# Patient Record
Sex: MALE | Race: ASIAN | NOT HISPANIC OR LATINO | Employment: FULL TIME | URBAN - METROPOLITAN AREA
[De-identification: names, ages, dates, MRNs, and addresses within clinical notes are randomized per-mention and may not be internally consistent; named-entity substitution may affect disease eponyms.]

---

## 2018-11-06 ENCOUNTER — OCCUPATIONAL MEDICINE (OUTPATIENT)
Dept: URGENT CARE | Facility: CLINIC | Age: 28
End: 2018-11-06
Payer: COMMERCIAL

## 2018-11-06 ENCOUNTER — APPOINTMENT (OUTPATIENT)
Dept: RADIOLOGY | Facility: IMAGING CENTER | Age: 28
End: 2018-11-06
Attending: PHYSICIAN ASSISTANT
Payer: COMMERCIAL

## 2018-11-06 VITALS
HEIGHT: 74 IN | RESPIRATION RATE: 14 BRPM | HEART RATE: 68 BPM | TEMPERATURE: 98 F | DIASTOLIC BLOOD PRESSURE: 80 MMHG | WEIGHT: 226 LBS | BODY MASS INDEX: 29 KG/M2 | OXYGEN SATURATION: 97 % | SYSTOLIC BLOOD PRESSURE: 112 MMHG

## 2018-11-06 DIAGNOSIS — S67.10XA CRUSHING INJURY OF FINGER, INITIAL ENCOUNTER: ICD-10-CM

## 2018-11-06 PROCEDURE — 73140 X-RAY EXAM OF FINGER(S): CPT | Mod: 26,RT,29 | Performed by: PHYSICIAN ASSISTANT

## 2018-11-06 PROCEDURE — 99203 OFFICE O/P NEW LOW 30 MIN: CPT | Mod: 29 | Performed by: PHYSICIAN ASSISTANT

## 2018-11-06 NOTE — PROGRESS NOTES
"Subjective:      Ruben Dykes is a 28 y.o. male who presents with Finger Injury (WC New, Crush injury to R/ index finger)      DOI: 11/6/18, 9am. Crush injury right index finger. Was moving 2 \"modules\" which are approx 800lbs. His finger got caught and smashed. Pain tolerable. Bruising, no broken skin. ROM limited. Has not taken anything yet. No previous injury. No 2nd job.     HPI    ROS    Medications, Allergies, and current problem list reviewed today in Epic     Objective:     /80 (BP Location: Left arm, Patient Position: Sitting, BP Cuff Size: Adult)   Pulse 68   Temp 36.7 °C (98 °F) (Temporal)   Resp 14   Ht 1.88 m (6' 2\")   Wt 102.5 kg (226 lb)   SpO2 97%   BMI 29.02 kg/m²      Physical Exam   Constitutional: He is oriented to person, place, and time. He appears well-developed and well-nourished. No distress.   HENT:   Head: Normocephalic and atraumatic.   Eyes: Conjunctivae and EOM are normal.   Neck: Normal range of motion. Neck supple.   Cardiovascular: Normal rate, regular rhythm and normal heart sounds.    No murmur heard.  Pulmonary/Chest: Effort normal and breath sounds normal. No respiratory distress. He has no wheezes.   Neurological: He is alert and oriented to person, place, and time.   Skin: Skin is warm and dry. He is not diaphoretic.   Psychiatric: He has a normal mood and affect. His behavior is normal. Judgment and thought content normal.   Nursing note and vitals reviewed.      Vitals reviewed, well-appearing male in no apparent distress.  Crush injury to distal phalanx of right index finger.  Localized soft tissue swelling, tenderness, ecchymosis.  No broken skin or nail involvement.  Pain does not extend approximately from PIP joint.  Range of motion limited.  Distal neurovascular intact.       Assessment/Plan:     1. Crushing injury of finger, initial encounter  DX-FINGER(S) 2+ RIGHT     Xray: no fracture or dislocation by my read. Radiology review pending.    Finger " splint  Rice therapy  OTC meds as needed  Work restrictions  Follow-up 3 days    Please note that this dictation was created using voice recognition software. I have made every reasonable attempt to correct obvious errors, but I expect that there are errors of grammar and possibly content that I did not discover before finalizing the note.

## 2018-11-06 NOTE — LETTER
"EMPLOYEE’S CLAIM FOR COMPENSATION/ REPORT OF INITIAL TREATMENT  FORM C-4    EMPLOYEE’S CLAIM - PROVIDE ALL INFORMATION REQUESTED   First Name  Ruben Last Name  Donis Birthdate                    1990                Sex  male Claim Number   Home Address  [address] Age  28 y.o. Height  1.88 m (6' 2\") Weight  102.5 kg (226 lb) [facility]     [place] [facility] [place] Zip  [address] Telephone  [phone] (home)    Mailing Address  [address] Zip  [address] Primary Language Spoken  English    Insurer   Third Party   [employer]   Employee's Occupation (Job Title) When Injury or Occupational Disease Occurred      Employer's Name  [employer]  Telephone  [phone]    Employer Address  [address]  [place]  Zip  [address]    Date of Injury  11/6/2018               Hour of Injury  9:00 AM Date Employer Notified  11/6/2018 Last Day of Work after Injury or Occupational Disease  11/6/2018 Supervisor to Whom Injury Reported  thierry cox   Address or Location of Accident (if applicable)  [[employer]  [address]   What were you doing at the time of accident? 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 (Be specific an answer in detail. Use additional sheet if necessary)  While shifting one module to the side another module smashed my fonger   If you believe that you have an occupational disease, when did you first have knowledge of the disability and it relationship to your employment?  n/a Witnesses to the Accident  n/a      Nature of Injury or Occupational Disease  Crushing  Part(s) of Body Injured or Affected  Finger (R), N/A, N/A    I certify that the above is true and correct to the best of my knowledge and that I have provided this information in order to obtain the benefits of " [place]).  I hereby authorize any physician, chiropractor, surgeon, practitioner, or other person, any hospital, including [facility] or [facility], any medical service organization, any insurance company, or other institution or organization to release to each other, any medical or other information, including benefits paid or payable, pertinent to this injury or disease, except information relative to diagnosis, treatment and/or counseling for AIDS, psychological conditions, alcohol or controlled substances, for which I must give specific authorization.  A Photostat of this authorization shall be as valid as the original.     Date   Place   Employee’s Signature   THIS REPORT MUST BE COMPLETED AND MAILED WITHIN 3 WORKING DAYS OF TREATMENT   Place  [facility]  Name of Facility  [facility]   Date  11/6/2018 Diagnosis  (S67.10XA) Crushing injury of finger, initial encounter Is there evidence the injured employee was under the influence of alcohol and/or another controlled substance at the time of accident?   Hour  9:44 AM Description of Injury or Disease  The encounter diagnosis was Crushing injury of finger, initial encounter. No   Treatment  Crush injury, x-ray negative  Finger splint  OTC meds as needed  Follow-up 3 days  Have you advised the patient to remain off work five days or more? No   X-Ray Findings  Negative  Comments:Finger series   If Yes   From Date  To Date      From information given by the employee, together with medical evidence, can you directly connect this injury or occupational disease as job incurred?  Yes If No Full Duty  No Modified Duty  Yes   Is additional medical care by a physician indicated?  Yes If Modified Duty, Specify any Limitations / Restrictions  Weight limit 25 pounds, right hand only   Do you know of any previous injury or disease  "contributing to this condition or occupational disease?                            No   Date  11/6/2018 Print Doctor’s Name Sheyla SALGADOWilbert SOTO Alvarado I certify the employer’s copy of  this form was mailed on:   Address  [address], [address] Insurer’s Use Only     [place] Zip  [address]    Provider’s Tax ID Number  [id number] Telephone  Dept: [phone]        ac-SHEYLA Gallegos P.A.-C.   e-Signature: Dr. Romel Norris, Medical Director Degree  KAYLI        ORIGINAL-TREATING PHYSICIAN OR CHIROPRACTOR    PAGE 2-INSURER/TPA    PAGE 3-EMPLOYER    PAGE 4-EMPLOYEE             Form C-4 (rev10/07)              BRIEF DESCRIPTION OF RIGHTS AND BENEFITS  (Pursuant to [place]RS 616C.050)    Notice of Injury or Occupational Disease (Incident Report Form C-1): If an injury or occupational disease (OD) arises out of and in the  course of employment, you must provide written notice to your employer as soon as practicable, but no later than 7 days after the accident or  OD. Your employer shall maintain a sufficient supply of the required forms.    Claim for Compensation (Form C-4): If medical treatment is sought, the form C-4 is available at the place of initial treatment. A completed  \"Claim for Compensation\" (Form C-4) must be filed within 90 days after an accident or OD. The treating physician or chiropractor must,  within 3 working days after treatment, complete and mail to the employer, the employer's insurer and third-party , the Claim for  Compensation.    Medical Treatment: If you require medical treatment for your on-the-job injury or OD, you may be required to select a physician or  chiropractor from a list provided by your workers’ compensation insurer, if it has contracted with an Organization for Managed Care (MCO) or  Preferred Provider Organization (PPO) or providers of health care. If your employer has not entered into a contract with an MCO or PPO, you  may select a physician or " chiropractor from the Panel of Physicians and Chiropractors. Any medical costs related to your industrial injury or  OD will be paid by your insurer.    Temporary Total Disability (TTD): If your doctor has certified that you are unable to work for a period of at least 5 consecutive days, or 5  cumulative days in a 20-day period, or places restrictions on you that your employer does not accommodate, you may be entitled to TTD  compensation.    Temporary Partial Disability (TPD): If the wage you receive upon reemployment is less than the compensation for TTD to which you are  entitled, the insurer may be required to pay you TPD compensation to make up the difference. TPD can only be paid for a maximum of 24  months.    Permanent Partial Disability (PPD): When your medical condition is stable and there is an indication of a PPD as a result of your injury or  OD, within 30 days, your insurer must arrange for an evaluation by a rating physician or chiropractor to determine the degree of your PPD. The  amount of your PPD award depends on the date of injury, the results of the PPD evaluation and your age and wage.    Permanent Total Disability (PTD): If you are medically certified by a treating physician or chiropractor as permanently and totally disabled  and have been granted a PTD status by your insurer, you are entitled to receive monthly benefits not to exceed 66 2/3% of your average  monthly wage. The amount of your PTD payments is subject to reduction if you previously received a PPD award.    Vocational Rehabilitation Services: You may be eligible for vocational rehabilitation services if you are unable to return to the job due to a  permanent physical impairment or permanent restrictions as a result of your injury or occupational disease.    Transportation and Per Elinor Reimbursement: You may be eligible for travel expenses and per elinor associated with medical treatment.    Reopening: You may be able to reopen your  claim if your condition worsens after claim closure.    Appeal Process: If you disagree with a written determination issued by the insurer or the insurer does not respond to your request, you may  appeal to the Department of Administration, , by following the instructions contained in your determination letter. You must  appeal the determination within 70 days from the date of the determination letter at [address], [address], [place], [place]  [address], or [address], [address], [place], [place] [address]. If you disagree with the  decision, you may appeal to the  Department of Administration, . You must file your appeal within 30 days from the date of the  decision  letter at [address], [address], [place], [place] [address], or [address], [address], [place], [place] [address]. If you  disagree with a decision of an , you may file a petition for judicial review with the District Court. You must do so within 30  days of the Appeal Officer’s decision. You may be represented by an  at your own expense or you may contact the [facility] for possible  representation.    [place]  for Injured Workers (NAIW): If you disagree with a  decision, you may request that NAIW represent you  without charge at an  Hearing. For information regarding denial of benefits, you may contact the [facility] at: [address], [address], [place] [address], [phone], or [address], [place] [address], [phone]    To File a Complaint with the Division: If you wish to file a complaint with the  of the Division of Industrial Relations (DIR),  please contact the Workers’ Compensation Section, [address], [address], [place], [place] [address], telephone (375) [phone], or  [address], [place]  [address], telephone (880) [phone].    For assistance with Workers’ Compensation Issues: you may contact the Office of the Governor Consumer Health Assistance, [address] [place] [address], [facility] [facility] [address], [place] [address], Toll Free 1-896.794.7320, Web site: http://TheFormTool.[facility].nv., E-mail  [email or web address].[email or web address].                                                                                                                                                                                                                                   __________________________________________________________________                                                                   _________________                Employee Name / Signature                                                                                                                                                       Date                                                                                                                                                                                                     D-2 (rev. 10/07)

## 2018-11-06 NOTE — LETTER
"   [facility]  [address] [place] [address]  Phone:  [phone] - Fax:  [phone]   [facility] Progress Report and Disability Certification  Date of Service: 11/6/2018   No Show:  No  Date / Time of Next Visit: 11/9/2018   Claim Information   Patient Name: Ruben Dykes  Claim Number:     Employer: [employer]  Date of Injury: 11/6/2018     Insurer / TPA: [employer]  ID / SSN:     Occupation:   Diagnosis: The encounter diagnosis was Crushing injury of finger, initial encounter.    Medical Information   Related to Industrial Injury? Yes    Subjective Complaints:  DOI: 11/6/18, 9am. Crush injury right index finger. Was moving 2 \"modules\" which are approx 800lbs. His finger got caught and smashed. Pain tolerable. Bruising, no broken skin. ROM limited. Has not taken anything yet. No previous injury. No 2nd job.   Objective Findings: Vitals reviewed, well-appearing male in no apparent distress.  Crush injury to distal phalanx of right index finger.  Localized soft tissue swelling, tenderness, ecchymosis.  No broken skin or nail involvement.  Pain does not extend approximately from PIP joint.  Range of motion limited.  Distal neurovascular intact.   Pre-Existing Condition(s): None   Assessment:   Initial Visit    Status: Additional Care Required  Permanent Disability:No    Plan: Medication  Comments:OTC as needed    Diagnostics: X-ray  Comments:Negative    Comments:       Disability Information   Status: Released to Restricted Duty    From:  11/6/2018  Through: 11/9/2018 Restrictions are: Temporary   Physical Restrictions   Sitting:    Standing:    Stooping:    Bending:      Squatting:    Walking:    Climbing:    Pushing:      Pulling:    Other:    Reaching Above Shoulder (L):   Reaching Above Shoulder (R):       Reaching Below Shoulder (L):    Reaching Below Shoulder (R):      Not to exceed Weight Limits   Carrying(hrs):   " Weight Limit(lb): < or = to 25 pounds  Comments:Right hand only Lifting(hrs):   Weight  Limit(lb): < or = to 25 pounds  Comments:Right hand only   Comments:      Repetitive Actions   Hands: i.e. Fine Manipulations from Grasping:     Feet: i.e. Operating Foot Controls:     Driving / Operate Machinery:     Physician Name: Sheyla Alvarado P.A.-C. Physician Signature: SHEYLA Bennett P.A.-C. e-Signature: Dr. Romel Norris, Medical Director   Clinic Name / Location: [address], [address]  [facility] [place] [address] Clinic Phone Number: Dept: [phone]   Appointment Time: 9:30 Am Visit Start Time: 9:44 AM   Check-In Time:  9:38 Am Visit Discharge Time:  10:20 AM   Original-Treating Physician or Chiropractor    Page 2-Insurer/TPA    Page 3-Employer    Page 4-Employee

## 2018-11-09 ENCOUNTER — OCCUPATIONAL MEDICINE (OUTPATIENT)
Dept: URGENT CARE | Facility: CLINIC | Age: 28
End: 2018-11-09
Payer: COMMERCIAL

## 2018-11-09 VITALS
WEIGHT: 226 LBS | DIASTOLIC BLOOD PRESSURE: 72 MMHG | RESPIRATION RATE: 16 BRPM | OXYGEN SATURATION: 100 % | HEIGHT: 74 IN | TEMPERATURE: 98.2 F | BODY MASS INDEX: 29 KG/M2 | HEART RATE: 87 BPM | SYSTOLIC BLOOD PRESSURE: 124 MMHG
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
## 2018-11-09 DIAGNOSIS — S67.10XD CRUSHING INJURY OF FINGER, SUBSEQUENT ENCOUNTER: ICD-10-CM

## 2018-11-09 PROCEDURE — 99213 OFFICE O/P EST LOW 20 MIN: CPT | Performed by: NURSE PRACTITIONER

## 2018-11-09 ASSESSMENT — PAIN SCALES - GENERAL: PAINLEVEL: 6=MODERATE PAIN

## 2018-11-09 NOTE — LETTER
"   [facility]  [address] [place] [address]  Phone:  [phone] - Fax:  [phone]   [facility] Progress Report and Disability Certification  Date of Service: 11/9/2018   No Show:  No  Date / Time of Next Visit: 11/16/2018   Claim Information   Patient Name: Ruben Dykes  Claim Number:     Employer: [employer]  Date of Injury: 11/6/2018     Insurer / TPA: [employer]  ID / SSN:     Occupation:   Diagnosis: Diagnoses of Crushing injury of finger, subsequent encounter and Work related injury were pertinent to this visit.    Medical Information   Related to Industrial Injury? Yes    Subjective Complaints:  DOI 11/06/18. Smashed his right index finger in a heavy module. Tender to touch or move. Small blood blister - flat on volor surface of finger.  Wearing finger splint all the time. Working at full duty. No longer taking any OTC medication for pain. Denies numbness, tingling. \" It is so much better today\". No other aggravating or alleviating factors.      Objective Findings: Right index finger has YOLY. TTP - entire finger. Limited ROM due to YOLY. Ecchymosis to distal plange joint. TTP. No pain at MCP joint.Brisk cap refill at finger tip < 3 sec.  Reviewed previous xray done 11/06/18 with pt. No acute fracture or dislocation by my read.    Pre-Existing Condition(s):     Assessment:   Condition Improved    Status: Additional Care Required  Permanent Disability:No    Plan:      Diagnostics:      Comments:       Disability Information   Status: Released to Restricted Duty    From:  11/9/2018  Through: 11/16/2018 Restrictions are:     Physical Restrictions   Sitting:    Standing:    Stooping:    Bending:      Squatting:    Walking:    Climbing:    Pushing:      Pulling:    Other:    Reaching Above Shoulder (L):   Reaching Above Shoulder (R):       Reaching Below Shoulder (L):    Reaching Below Shoulder (R):      Not to exceed " Weight Limits   Carrying(hrs):   Weight Limit(lb):   Lifting(hrs):   Weight  Limit(lb):     Comments: Limited use of his right index finger.     Repetitive Actions   Hands: i.e. Fine Manipulations from Grasping:     Feet: i.e. Operating Foot Controls:     Driving / Operate Machinery:     Physician Name: AZALEA Dickerson Physician Signature:   e-Signature: Dr. Romel Norris, Medical Director   Clinic Name / Location: [address], [address]  [place] [address] Clinic Phone Number: Dept: [phone]   Appointment Time: 10:00 Am Visit Start Time: 10:10 AM   Check-In Time:  9:58 Am Visit Discharge Time:  10:43 AM   Original-Treating Physician or Chiropractor    Page 2-Insurer/TPA    Page 3-Employer    Page 4-Employee

## 2018-11-09 NOTE — PROGRESS NOTES
"Subjective:      Ruben Dykes is a 28 y.o. male who presents with Follow-Up (WC DOI 11/6/18 Crush injury to R/ index finger better but sore)      Denies past medical, surgical or family history that is significant to today's problem.   RX or OTC medications reviewed with patient today.   No Known Allergies       HPI DOI 11/06/18. Smashed his right index finger in a heavy module. Tender to touch or move. Small blood blister - flat on volor surface of finger.  Wearing finger splint all the time. Working at full duty. No longer taking any OTC medication for pain. Denies numbness, tingling. \" It is so much better today\". No other aggravating or alleviating factors.       ROS  Denies numbness, tingling, sensation change, fever, weakness of hand/ finger.      Objective:     /72 (BP Location: Right arm, Patient Position: Sitting, BP Cuff Size: Large adult)   Pulse 87   Temp 36.8 °C (98.2 °F) (Temporal)   Resp 16   Ht 1.88 m (6' 2\")   Wt 102.5 kg (226 lb)   SpO2 100%   BMI 29.02 kg/m²      Physical Exam   Constitutional: He is oriented to person, place, and time. He appears well-developed and well-nourished.   Cardiovascular: Normal rate.    Neurological: He is alert and oriented to person, place, and time.   Skin: Skin is warm. Capillary refill takes less than 2 seconds.   Psychiatric: He has a normal mood and affect. His behavior is normal. Thought content normal.   Nursing note and vitals reviewed.      Right index finger has YOLY. TTP - entire finger. Limited ROM due to YOLY. Ecchymosis to distal plange joint. TTP. No pain at MCP joint.Brisk cap refill at finger tip < 3 sec.  Reviewed previous xray done 11/06/18 with pt. No acute fracture or dislocation by my read.        Assessment/Plan:     1. Crushing injury of finger, subsequent encounter      2. Work related injury    See [place] D39   RTC 1 week  Finger splint prn - start increasing time that you are not using it. Do not wear it while sleeping. "   Ice/warm soaks prn discomfort  OTC age appropriate, analgesic of choice. such as acetaminophen (Ex brand name: Tylenol), ibuprofen (Ex brand names: Advil, Motrin), or naproxen   (Ex brand names: Aleve, Naprosyn).  Follow manufactures dosing and safety precautions.

## 2018-12-03 ENCOUNTER — OFFICE VISIT (OUTPATIENT)
Dept: URGENT CARE | Facility: CLINIC | Age: 28
End: 2018-12-03

## 2018-12-03 ENCOUNTER — NON-PROVIDER VISIT (OUTPATIENT)
Dept: URGENT CARE | Facility: CLINIC | Age: 28
End: 2018-12-03

## 2018-12-03 DIAGNOSIS — Z01.89 ENCOUNTER FOR RESPIRATORY CLEARANCE EXAMINATION: ICD-10-CM

## 2018-12-03 PROCEDURE — 8916 PR CLEARANCE ONLY: Performed by: NURSE PRACTITIONER

## 2018-12-03 PROCEDURE — 94375 RESPIRATORY FLOW VOLUME LOOP: CPT | Performed by: NURSE PRACTITIONER

## 2018-12-04 NOTE — PROGRESS NOTES
Ruben Garcia Donis is a 28 y.o. male here for a non-provider visit for Resp. Clearance/Mask Fit    If abnormal was an in office provider notified today (if so, indicate provider)? Yes  Routed to PCP? No

## 2018-12-04 NOTE — PROGRESS NOTES
Rubenchris Dykes is a 28 y.o. male here for a non-provider visit for Mask Fit/Resp. Clearance.    If abnormal was an in office provider notified today (if so, indicate provider)? Yes  Routed to PCP? No